# Patient Record
Sex: MALE | Race: WHITE | URBAN - METROPOLITAN AREA
[De-identification: names, ages, dates, MRNs, and addresses within clinical notes are randomized per-mention and may not be internally consistent; named-entity substitution may affect disease eponyms.]

---

## 2017-12-12 ENCOUNTER — OFFICE VISIT (OUTPATIENT)
Dept: FAMILY MEDICINE CLINIC | Facility: CLINIC | Age: 32
End: 2017-12-12

## 2017-12-12 VITALS
WEIGHT: 214 LBS | DIASTOLIC BLOOD PRESSURE: 80 MMHG | HEART RATE: 72 BPM | RESPIRATION RATE: 15 BRPM | HEIGHT: 70 IN | TEMPERATURE: 98 F | SYSTOLIC BLOOD PRESSURE: 110 MMHG | BODY MASS INDEX: 30.64 KG/M2

## 2017-12-12 DIAGNOSIS — R30.0 DYSURIA: ICD-10-CM

## 2017-12-12 DIAGNOSIS — R39.15 URINARY URGENCY: ICD-10-CM

## 2017-12-12 DIAGNOSIS — N20.0 RENAL CALCULI: Primary | ICD-10-CM

## 2017-12-12 PROBLEM — J45.909 ASTHMA: Status: ACTIVE | Noted: 2017-12-12

## 2017-12-12 PROBLEM — J30.89 ENVIRONMENTAL AND SEASONAL ALLERGIES: Status: ACTIVE | Noted: 2017-12-12

## 2017-12-12 PROCEDURE — 81003 URINALYSIS AUTO W/O SCOPE: CPT | Performed by: NURSE PRACTITIONER

## 2017-12-12 PROCEDURE — 99203 OFFICE O/P NEW LOW 30 MIN: CPT | Performed by: NURSE PRACTITIONER

## 2017-12-12 RX ORDER — CIPROFLOXACIN 500 MG/1
500 TABLET, FILM COATED ORAL 2 TIMES DAILY
Qty: 20 TABLET | Refills: 0 | Status: SHIPPED | OUTPATIENT
Start: 2017-12-12 | End: 2017-12-12

## 2017-12-12 RX ORDER — HYDROCODONE BITARTRATE AND ACETAMINOPHEN 5; 325 MG/1; MG/1
1 TABLET ORAL EVERY 6 HOURS PRN
COMMUNITY
End: 2017-12-27

## 2017-12-12 NOTE — PATIENT INSTRUCTIONS
Urine sample today to test for bladder infection. Will wait on urine culture to assess if antibiotic is needed. culture will take 3-4 days. Urology referral given.  Confirm with insurance that this urologist is in network  Go to ER if pain occurs, fever, c

## 2017-12-12 NOTE — PROGRESS NOTES
[address]  PROGRESS NOTE  Mk Patton is a 28year old male. Chief Complaint:  Patient presents with:   Other: requesting referral to urogist also having urianry frequency     HPI:   Patient presents to office visit with for ER nasal congestion, sore throat, vision changes.   RESPIRATORY: denies shortness of breath with exertion  CARDIOVASCULAR: denies chest pain on exertion  GI: denies abdominal pain and denies heartburn  : see HPI  NEURO: denies headaches    EXAM:   /80 [employer] care instructions as listed in Patient Instructions    Meds & Refills for this Visit:    No prescriptions requested or ordered in this encounter       Risks, benefits, side effects of medication addressed and explained.     Patient Instructions   Ur

## 2017-12-27 ENCOUNTER — APPOINTMENT (OUTPATIENT)
Dept: LAB | Age: 32
End: 2017-12-27
Attending: FAMILY MEDICINE
Payer: COMMERCIAL

## 2017-12-27 ENCOUNTER — OFFICE VISIT (OUTPATIENT)
Dept: FAMILY MEDICINE CLINIC | Facility: CLINIC | Age: 32
End: 2017-12-27

## 2017-12-27 VITALS
WEIGHT: 209.63 LBS | DIASTOLIC BLOOD PRESSURE: 80 MMHG | SYSTOLIC BLOOD PRESSURE: 118 MMHG | TEMPERATURE: 97 F | HEART RATE: 64 BPM | HEIGHT: 70.5 IN | BODY MASS INDEX: 29.68 KG/M2 | RESPIRATION RATE: 16 BRPM

## 2017-12-27 DIAGNOSIS — Z11.3 SCREEN FOR STD (SEXUALLY TRANSMITTED DISEASE): Primary | ICD-10-CM

## 2017-12-27 PROCEDURE — 86696 HERPES SIMPLEX TYPE 2 TEST: CPT | Performed by: FAMILY MEDICINE

## 2017-12-27 PROCEDURE — 86780 TREPONEMA PALLIDUM: CPT | Performed by: FAMILY MEDICINE

## 2017-12-27 PROCEDURE — 87340 HEPATITIS B SURFACE AG IA: CPT | Performed by: FAMILY MEDICINE

## 2017-12-27 PROCEDURE — 86694 HERPES SIMPLEX NES ANTBDY: CPT | Performed by: FAMILY MEDICINE

## 2017-12-27 PROCEDURE — 86803 HEPATITIS C AB TEST: CPT | Performed by: FAMILY MEDICINE

## 2017-12-27 PROCEDURE — 86706 HEP B SURFACE ANTIBODY: CPT | Performed by: FAMILY MEDICINE

## 2017-12-27 PROCEDURE — 36415 COLL VENOUS BLD VENIPUNCTURE: CPT | Performed by: FAMILY MEDICINE

## 2017-12-27 PROCEDURE — 99214 OFFICE O/P EST MOD 30 MIN: CPT | Performed by: FAMILY MEDICINE

## 2017-12-27 PROCEDURE — 86695 HERPES SIMPLEX TYPE 1 TEST: CPT | Performed by: FAMILY MEDICINE

## 2017-12-27 PROCEDURE — 87389 HIV-1 AG W/HIV-1&-2 AB AG IA: CPT | Performed by: FAMILY MEDICINE

## 2017-12-27 NOTE — PROGRESS NOTES
[facility] [place]  PROGRESS NOTE  Chief Complaint:   Patient presents with:  STD: pt wants std check      HPI:   This is a 28year old male presents for STD screening test.  Patient does not have any history of STD, also denies any recent expos edema, dyspnea on exertion or at rest.  RESPIRATORY:  Denies shortness of breath, wheezing, cough or sputum. GASTROINTESTINAL:  Denies abdominal pain, nausea, vomiting, constipation, diarrhea, or blood in stool.   MUSCULOSKELETAL:  Denies weakness, muscle Debora Guerreroon was seen today for std.     Diagnoses and all orders for this visit:    Screen for STD (sexually transmitted disease)  -     T PALLIDUM SCREENING CASCADE  -     HIV AG AB COMBO  -     HEPATITIS B SURFACE ANTIBODY  -     HEPATITIS B SURFACE ANTIGEN

## 2017-12-28 ENCOUNTER — TELEPHONE (OUTPATIENT)
Dept: FAMILY MEDICINE CLINIC | Facility: CLINIC | Age: 32
End: 2017-12-28

## 2017-12-28 RX ORDER — AZITHROMYCIN 500 MG/1
1000 TABLET, FILM COATED ORAL ONCE
Qty: 2 TABLET | Refills: 0 | Status: SHIPPED | OUTPATIENT
Start: 2017-12-28 | End: 2017-12-28

## 2017-12-28 NOTE — TELEPHONE ENCOUNTER
Please inform patient that he is positive for chlamydia. Recommend to treat with azithromycin single dose. Will send medication to pharmacy. Also recommend that he inform his partner and have them get it treated also.   Rest of the STD panel is negative,

## 2017-12-28 NOTE — TELEPHONE ENCOUNTER
Let pt know the following below. Pt verbalized his understanding and had no other questions at this time. Medication sent. Form filled out.

## 2017-12-29 ENCOUNTER — TELEPHONE (OUTPATIENT)
Dept: FAMILY MEDICINE CLINIC | Facility: CLINIC | Age: 32
End: 2017-12-29

## 2018-03-16 ENCOUNTER — OFFICE VISIT (OUTPATIENT)
Dept: FAMILY MEDICINE CLINIC | Facility: CLINIC | Age: 33
End: 2018-03-16

## 2018-03-16 VITALS
OXYGEN SATURATION: 99 % | HEIGHT: 70 IN | HEART RATE: 70 BPM | TEMPERATURE: 98 F | DIASTOLIC BLOOD PRESSURE: 74 MMHG | BODY MASS INDEX: 29.12 KG/M2 | RESPIRATION RATE: 20 BRPM | WEIGHT: 203.38 LBS | SYSTOLIC BLOOD PRESSURE: 122 MMHG

## 2018-03-16 DIAGNOSIS — J45.901 MILD ASTHMA EXACERBATION: Primary | ICD-10-CM

## 2018-03-16 DIAGNOSIS — K52.9 GASTROENTERITIS: ICD-10-CM

## 2018-03-16 PROCEDURE — 99213 OFFICE O/P EST LOW 20 MIN: CPT | Performed by: NURSE PRACTITIONER

## 2018-03-16 RX ORDER — AZITHROMYCIN 250 MG/1
TABLET, FILM COATED ORAL
Qty: 6 TABLET | Refills: 0 | Status: SHIPPED | OUTPATIENT
Start: 2018-03-16 | End: 2019-05-20 | Stop reason: ALTCHOICE

## 2018-03-16 RX ORDER — METHYLPREDNISOLONE 4 MG/1
TABLET ORAL
Qty: 1 KIT | Refills: 0 | Status: SHIPPED | OUTPATIENT
Start: 2018-03-16 | End: 2019-05-20 | Stop reason: ALTCHOICE

## 2018-03-16 NOTE — PROGRESS NOTES
Chief complaint:  Patient presents with:  Nausea  Fatigue  Body ache and/or chills    HPI:   Savana Johnson is a 28year old male who presents for c/o congestion, cough, intermittent wheezing, nausea, malaise, intermittent chills, mild generalized abd p EYES:PERRLA, EOMI, conjunctiva are clear, no injection or discharge, no orbital edema or erythema  HEAD: atraumatic, normocephalic, no sinus tenderness on palpation  ENT: TMs pearly, no bulging, no retraction, no fluid, landmarks present.  nares patent, muc Your healthcare provider may recommend a bland diet if you have an upset stomach. It consists of foods that are mild and easy to digest. It is better to eat small frequent meals rather than 3 large meals a day.     Beverages  OK: Fruit juices, non-caffeinat © 5860-6600 [address] [facility], [facility] [place]. All rights reserved. This information is not intended as a substitute for professional medical care. Always follow your healthcare professional's instructions.             The

## 2018-03-16 NOTE — PATIENT INSTRUCTIONS
Push fluids, rest.  Antibiotic as prescribed, take with food. Take medrol dose pack as prescribed, take with food. Use albuterol inhaler 2 puffs every 6 hours x 3 days, then as prescribed as needed. [place] diet and progress slowly as tolerated.    Far[place]h Avoid: Vegetables prepared with those spices to \"avoid\"; skin and seeds of vegetables and those with coarse fiber  Spices  OK: Salt, lemon and lime juice, vinegar, all extracts, ciara, cinnamon, thyme, mace, allspice, paprika  Avoid: Chili powder, cloves,

## 2018-03-21 ENCOUNTER — TELEPHONE (OUTPATIENT)
Dept: FAMILY MEDICINE CLINIC | Facility: CLINIC | Age: 33
End: 2018-03-21

## 2018-03-21 NOTE — TELEPHONE ENCOUNTER
Patient was seen on 3/16 for nausea, fatigue, body ache and chills. Patient was given a note for work to excuse from the day of visit but work is requesting a letter to release him back to work.      Patient states that he has been working since 3/17 but th

## 2018-11-28 NOTE — TELEPHONE ENCOUNTER
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 Recommend to return to clinic if he frequently has sores on his lips.   He is negative for HSV t Tramadol Approved        Filling Pharmacy: [facility]  Additional Information: Pharmacy contacted - received paid claim. Pharmacy will contact patient when medication is ready to be picked up.

## 2019-05-20 ENCOUNTER — OFFICE VISIT (OUTPATIENT)
Dept: FAMILY MEDICINE CLINIC | Facility: CLINIC | Age: 34
End: 2019-05-20
Payer: COMMERCIAL

## 2019-05-20 VITALS
BODY MASS INDEX: 34.07 KG/M2 | SYSTOLIC BLOOD PRESSURE: 120 MMHG | RESPIRATION RATE: 18 BRPM | OXYGEN SATURATION: 98 % | WEIGHT: 238 LBS | DIASTOLIC BLOOD PRESSURE: 78 MMHG | TEMPERATURE: 97 F | HEART RATE: 68 BPM | HEIGHT: 70 IN

## 2019-05-20 DIAGNOSIS — R51.9 COMPLAINT OF HEADACHE: ICD-10-CM

## 2019-05-20 DIAGNOSIS — Z00.00 ENCOUNTER FOR ANNUAL HEALTH EXAMINATION: Primary | ICD-10-CM

## 2019-05-20 DIAGNOSIS — R07.89 ATYPICAL CHEST PAIN: ICD-10-CM

## 2019-05-20 DIAGNOSIS — R19.8 GASTROINTESTINAL COMPLAINTS: Primary | ICD-10-CM

## 2019-05-20 PROCEDURE — 99214 OFFICE O/P EST MOD 30 MIN: CPT | Performed by: NURSE PRACTITIONER

## 2019-05-20 NOTE — PATIENT INSTRUCTIONS
Elimination diet, starting with dairy. Do 7-10 days of common food groups (dairy, wheat/gluten, etc.) and remove them completely from your diet to assess for symptom improvement. Start Benefiber once a day. Start a probiotic once a day.    Continue to

## 2019-05-20 NOTE — PROGRESS NOTES
[facility] [place]  PROGRESS NOTE  Chief Complaint:   Patient presents with:  Loose Stools  Headache: 4-5 pain  Chest Pain: right side No SOB      HPI:   This is a 35year old male coming in for multiple medical concerns.     Loose stools for the utilizing the affected site. Past Medical History:   Diagnosis Date   • Asthma    • Renal calculi      History reviewed. No pertinent surgical history.   Social History:  Social History    Tobacco Use      Smoking status: Never Smoker      Smokeless t Estimated body mass index is 34.15 kg/m² as calculated from the following:    Height as of this encounter: 70\". Weight as of this encounter: 238 lb.    Wt Readings from Last 6 Encounters:  05/20/19 : 238 lb  03/16/18 : 203 lb 6 oz  12/27/17 : 209 lb 9.6 noted.    Okay to continue to use PRN ibuprofen for headache. 3. Atypical chest pain  No precipitating factors to right chest \"discomfort. \"  Not precipitated by activity and associated by other symptoms.   Pinching sensation comes and goes without the treatments as a result of today.      Problem List:  Patient Active Problem List:     Environmental and seasonal allergies     Asthma     Renal calculi      Maritza Foot, APRN  5/20/2019  9:13 AM    [unfilled]

## 2020-03-23 ENCOUNTER — TELEPHONE (OUTPATIENT)
Dept: FAMILY MEDICINE CLINIC | Facility: CLINIC | Age: 35
End: 2020-03-23

## 2020-03-23 RX ORDER — ALBUTEROL SULFATE 90 UG/1
1 AEROSOL, METERED RESPIRATORY (INHALATION) EVERY 4 HOURS PRN
Qty: 1 INHALER | Refills: 0 | Status: SHIPPED | OUTPATIENT
Start: 2020-03-23 | End: 2020-09-28

## 2020-03-23 NOTE — TELEPHONE ENCOUNTER
Patient states that his employer won't let him return to work until he takes a letter stating that he has asthma, patient would like Dr to write him a letter and also pt needs refill in his inhaler to [address] in [place] (pt didn't know the name o

## 2020-03-23 NOTE — TELEPHONE ENCOUNTER
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 States with his Asthma, he is asking if Dr. Blossom Holland can write him a note stating either     A.  He should be excused from work until at least April 7t

## 2020-03-23 NOTE — TELEPHONE ENCOUNTER
Albuterol inhaler sent. covid 19 letter in chart. Recommend patient to go to [facility] website for further information regarding virus and how to take care of self. If he has any symptoms then recommend to call.

## 2020-03-24 ENCOUNTER — TELEPHONE (OUTPATIENT)
Dept: FAMILY MEDICINE CLINIC | Facility: CLINIC | Age: 35
End: 2020-03-24

## 2020-03-24 NOTE — TELEPHONE ENCOUNTER
Please inform patient that currently that is only letter that we are writing. Recommend to call if he is having any fever, shortness of breath or any chest tightness.

## 2020-03-24 NOTE — TELEPHONE ENCOUNTER
Patient is calling stating that he picked up the note that was given to him and patient states that this is in no way anyway close to what he is needing. Patient states that all that letter is doing is telling his employer what the covid19 is.      Erin

## 2020-03-24 NOTE — TELEPHONE ENCOUNTER
Printed snapshot which patient can show to employer. I will not be able to give him any other letter. Patient can print his summary from my chart also.

## 2020-03-24 NOTE — TELEPHONE ENCOUNTER
Let pt know the following below. Pt verbalized his understanding and had no other questions at this time.    Letter at

## 2020-03-24 NOTE — TELEPHONE ENCOUNTER
Patient is upset about the following below. Patient states that all he wants is a letter simply stating that patient has a diagnosis of asthma. Patient states that all he needs the letter to say. Dr Sofi Mcclelland can you please advise.

## 2020-09-21 ENCOUNTER — VIRTUAL PHONE E/M (OUTPATIENT)
Dept: FAMILY MEDICINE CLINIC | Facility: CLINIC | Age: 35
End: 2020-09-21
Payer: COMMERCIAL

## 2020-09-21 VITALS — TEMPERATURE: 97 F | WEIGHT: 215 LBS | BODY MASS INDEX: 31 KG/M2

## 2020-09-21 DIAGNOSIS — R52 GENERALIZED BODY ACHES: ICD-10-CM

## 2020-09-21 DIAGNOSIS — R51.9 HEADACHE IN BACK OF HEAD: ICD-10-CM

## 2020-09-21 DIAGNOSIS — J02.9 SORE THROAT: ICD-10-CM

## 2020-09-21 DIAGNOSIS — R05.9 COUGH: Primary | ICD-10-CM

## 2020-09-21 PROCEDURE — G2012 BRIEF CHECK IN BY MD/QHP: HCPCS | Performed by: NURSE PRACTITIONER

## 2020-09-21 NOTE — PROGRESS NOTES
Virtual Telephone Check-In    Luis A Rosa verbally consents to a Virtual/Telephone Check-In visit on 09/21/20. Patient has been referred to the [facility] website at [email or web address] to review the yearly Consent to Treat document.     Patient under

## 2020-09-25 ENCOUNTER — TELEPHONE (OUTPATIENT)
Dept: FAMILY MEDICINE CLINIC | Facility: CLINIC | Age: 35
End: 2020-09-25

## 2020-09-25 NOTE — TELEPHONE ENCOUNTER
Pts boss needs a letter stating that we sent him to get a Covid test and that we have not received the results yet or call pt with results.

## 2020-09-25 NOTE — TELEPHONE ENCOUNTER
COVID test done through [facility], results have not yet been faxed to me though I can see them in [facility]. COVID test is negative on 09/21/20 though I cannot print results from [address]. Need symptom update.     I can send a AMTt message thro

## 2020-09-25 NOTE — TELEPHONE ENCOUNTER
Patient informed of the below results. Patient states he is feeling a lot better. Is no longer having any body aches, headaches, SOB, or fever. Does still have some coughing and nasal drainage but is otherwise he is feeling good.      Patient missed work

## 2020-09-28 ENCOUNTER — TELEPHONE (OUTPATIENT)
Dept: FAMILY MEDICINE CLINIC | Facility: CLINIC | Age: 35
End: 2020-09-28

## 2020-09-28 ENCOUNTER — OFFICE VISIT (OUTPATIENT)
Dept: FAMILY MEDICINE CLINIC | Facility: CLINIC | Age: 35
End: 2020-09-28
Payer: COMMERCIAL

## 2020-09-28 VITALS
TEMPERATURE: 98 F | HEART RATE: 81 BPM | BODY MASS INDEX: 30.21 KG/M2 | SYSTOLIC BLOOD PRESSURE: 122 MMHG | OXYGEN SATURATION: 98 % | WEIGHT: 211 LBS | HEIGHT: 70 IN | DIASTOLIC BLOOD PRESSURE: 80 MMHG | RESPIRATION RATE: 20 BRPM

## 2020-09-28 DIAGNOSIS — R09.81 NASAL CONGESTION: ICD-10-CM

## 2020-09-28 DIAGNOSIS — R05.9 COUGH: Primary | ICD-10-CM

## 2020-09-28 PROCEDURE — 99213 OFFICE O/P EST LOW 20 MIN: CPT | Performed by: NURSE PRACTITIONER

## 2020-09-28 PROCEDURE — 3008F BODY MASS INDEX DOCD: CPT | Performed by: NURSE PRACTITIONER

## 2020-09-28 PROCEDURE — 3079F DIAST BP 80-89 MM HG: CPT | Performed by: NURSE PRACTITIONER

## 2020-09-28 PROCEDURE — 3074F SYST BP LT 130 MM HG: CPT | Performed by: NURSE PRACTITIONER

## 2020-09-28 RX ORDER — TAMSULOSIN HYDROCHLORIDE 0.4 MG/1
0.4 CAPSULE ORAL DAILY
COMMUNITY
Start: 2017-11-19

## 2020-09-28 RX ORDER — DOXYCYCLINE HYCLATE 100 MG
100 TABLET ORAL 2 TIMES DAILY
Qty: 20 TABLET | Refills: 0 | Status: SHIPPED | OUTPATIENT
Start: 2020-09-28 | End: 2020-10-08

## 2020-09-28 RX ORDER — ALBUTEROL SULFATE 90 UG/1
1 AEROSOL, METERED RESPIRATORY (INHALATION) EVERY 4 HOURS PRN
Qty: 1 INHALER | Refills: 0 | Status: SHIPPED | OUTPATIENT
Start: 2020-09-28 | End: 2021-12-30

## 2020-09-28 NOTE — PROGRESS NOTES
CHIEF COMPLAINT:   Patient presents with:  Cough  Sinus Problem      HPI:   Martinez Hilton is a 28year old male who presents to clinic today with complaints of sinus congestion and cough.     Did virtual visit 09/21/20, tested for covid which was nega /80   Pulse 81   Temp 98.1 °F (36.7 °C)   Resp 20   Ht 70\"   Wt 211 lb (95.7 kg)   SpO2 98%   BMI 30.28 kg/m²   Wt Readings from Last 6 Encounters:  09/28/20 : 211 lb (95.7 kg)  09/21/20 : 215 lb (97.5 kg)  05/20/19 : 238 lb (108 kg)  03/16/18 : 2 Stressed importance of completing full course of antibiotic. Patient Instructions   COVID test is negative, no work restrictions at this time. Doxycycline 100mg twice a day for ten days for sinus congestion; take with food.   Start oral claritin once

## 2020-09-28 NOTE — PATIENT INSTRUCTIONS
COVID test is negative, no work restrictions at this time. Doxycycline 100mg twice a day for ten days for sinus congestion; take with food. Start oral claritin once a day for allergy management.   Use your albuterol inhaler 2 puffs every 4 while awake for

## 2020-09-28 NOTE — TELEPHONE ENCOUNTER
Patient's Covid testing came back Negative. States he still has a productive cough and congested nose. Patient denies having a fever. States this is day 9 of being sick.   Patient did go to work today but was asked to leave until he talked with his Doct

## 2020-09-28 NOTE — TELEPHONE ENCOUNTER
Recommend in office evaluation for symptoms for lung assessment, ENT assessment, vital signs, etc.  Since coughing (though covid is negative) would recommend calling when arriving and having come in through side door.

## 2020-09-28 NOTE — TELEPHONE ENCOUNTER
Patient informed of the below recommendations. Appt scheduled.      Future Appointments   Date Time Provider Tita Thomson   9/28/2020  2:30 PM Danley Sever, APRN [facility]

## 2021-04-09 DIAGNOSIS — Z23 NEED FOR VACCINATION: ICD-10-CM

## 2021-12-28 DIAGNOSIS — R05.9 COUGH: ICD-10-CM

## 2021-12-28 RX ORDER — ALBUTEROL SULFATE 90 UG/1
AEROSOL, METERED RESPIRATORY (INHALATION)
Qty: 25.5 G | Refills: 0 | OUTPATIENT
Start: 2021-12-28

## 2021-12-30 ENCOUNTER — PATIENT MESSAGE (OUTPATIENT)
Dept: FAMILY MEDICINE CLINIC | Facility: CLINIC | Age: 36
End: 2021-12-30

## 2021-12-30 DIAGNOSIS — R05.9 COUGH: ICD-10-CM

## 2021-12-30 RX ORDER — ALBUTEROL SULFATE 90 UG/1
1 AEROSOL, METERED RESPIRATORY (INHALATION) EVERY 4 HOURS PRN
Qty: 1 EACH | Refills: 0 | Status: SHIPPED | OUTPATIENT
Start: 2021-12-30

## 2021-12-30 NOTE — TELEPHONE ENCOUNTER
Please advise.      Future appt:    Last Appointment with provider:  9/28/2020    [place] [facility]:  Visit date not found  No results found for: CHOLEST, HDL, LDL, TRIGLY, TRIG  No results found for: EAG, A1C  No results found for: T4F, TSH,

## 2021-12-30 NOTE — TELEPHONE ENCOUNTER
From: Olivia Warren  To:  MELA Vogel  Sent: 12/30/2021 8:55 AM CST  Subject: new inhaler    can i please get a new inhaler refill

## 2021-12-30 NOTE — TELEPHONE ENCOUNTER
Pt martina response:    i would really just like a new inhaler please nothing has changed and i dont like to have to go out if i dont have to    i have a new insurance is all that changed and i dont have the money for a vist then an inhaler

## 2022-04-04 ENCOUNTER — OFFICE VISIT (OUTPATIENT)
Dept: FAMILY MEDICINE CLINIC | Facility: CLINIC | Age: 37
End: 2022-04-04
Payer: MEDICAID

## 2022-04-04 VITALS
WEIGHT: 229 LBS | SYSTOLIC BLOOD PRESSURE: 122 MMHG | TEMPERATURE: 98 F | BODY MASS INDEX: 32.78 KG/M2 | RESPIRATION RATE: 16 BRPM | OXYGEN SATURATION: 97 % | HEIGHT: 70 IN | DIASTOLIC BLOOD PRESSURE: 78 MMHG | HEART RATE: 70 BPM

## 2022-04-04 DIAGNOSIS — J30.1 SEASONAL ALLERGIC RHINITIS DUE TO POLLEN: ICD-10-CM

## 2022-04-04 DIAGNOSIS — J45.20 MILD INTERMITTENT ASTHMA, UNSPECIFIED WHETHER COMPLICATED: Primary | ICD-10-CM

## 2022-04-04 PROCEDURE — 3074F SYST BP LT 130 MM HG: CPT | Performed by: NURSE PRACTITIONER

## 2022-04-04 PROCEDURE — 3008F BODY MASS INDEX DOCD: CPT | Performed by: NURSE PRACTITIONER

## 2022-04-04 PROCEDURE — 99213 OFFICE O/P EST LOW 20 MIN: CPT | Performed by: NURSE PRACTITIONER

## 2022-04-04 PROCEDURE — 3078F DIAST BP <80 MM HG: CPT | Performed by: NURSE PRACTITIONER

## 2022-04-04 RX ORDER — LORATADINE 10 MG/1
10 TABLET ORAL DAILY
COMMUNITY

## 2022-04-04 RX ORDER — FLUTICASONE PROPIONATE 50 MCG
1 SPRAY, SUSPENSION (ML) NASAL DAILY
COMMUNITY

## 2022-04-04 RX ORDER — ALBUTEROL SULFATE 90 UG/1
1 AEROSOL, METERED RESPIRATORY (INHALATION) EVERY 4 HOURS PRN
Qty: 1 EACH | Refills: 1 | Status: SHIPPED | OUTPATIENT
Start: 2022-04-04

## 2022-04-04 NOTE — PATIENT INSTRUCTIONS
Refill of albuterol   Start your loratadine once a day and start flonase 1 spray each nostril daily; use through the spring allergy season, use consistently  Recommend Pneumonia vaccination (PCV 23) either here, health department, or through pharmacy with history of asthma; can check cost effectiveness between sites    Schedule an annual physical and fasting labs for age appropriate health screenings and fasting labs

## 2022-10-03 ENCOUNTER — OFFICE VISIT (OUTPATIENT)
Dept: FAMILY MEDICINE CLINIC | Facility: CLINIC | Age: 37
End: 2022-10-03
Payer: MEDICAID

## 2022-10-03 VITALS
DIASTOLIC BLOOD PRESSURE: 88 MMHG | OXYGEN SATURATION: 99 % | WEIGHT: 237 LBS | HEIGHT: 70 IN | HEART RATE: 84 BPM | RESPIRATION RATE: 16 BRPM | BODY MASS INDEX: 33.93 KG/M2 | SYSTOLIC BLOOD PRESSURE: 116 MMHG | TEMPERATURE: 97 F

## 2022-10-03 DIAGNOSIS — L03.011 PARONYCHIA OF FINGER OF RIGHT HAND: Primary | ICD-10-CM

## 2022-10-03 PROCEDURE — 87070 CULTURE OTHR SPECIMN AEROBIC: CPT | Performed by: NURSE PRACTITIONER

## 2022-10-03 PROCEDURE — 87147 CULTURE TYPE IMMUNOLOGIC: CPT | Performed by: NURSE PRACTITIONER

## 2022-10-03 PROCEDURE — 99213 OFFICE O/P EST LOW 20 MIN: CPT | Performed by: NURSE PRACTITIONER

## 2022-10-03 PROCEDURE — 87205 SMEAR GRAM STAIN: CPT | Performed by: NURSE PRACTITIONER

## 2022-10-03 PROCEDURE — 87186 SC STD MICRODIL/AGAR DIL: CPT | Performed by: NURSE PRACTITIONER

## 2022-10-03 PROCEDURE — 3074F SYST BP LT 130 MM HG: CPT | Performed by: NURSE PRACTITIONER

## 2022-10-03 PROCEDURE — 3008F BODY MASS INDEX DOCD: CPT | Performed by: NURSE PRACTITIONER

## 2022-10-03 PROCEDURE — 3079F DIAST BP 80-89 MM HG: CPT | Performed by: NURSE PRACTITIONER

## 2022-10-03 RX ORDER — CEPHALEXIN 500 MG/1
500 CAPSULE ORAL 3 TIMES DAILY
Qty: 21 CAPSULE | Refills: 0 | Status: SHIPPED | OUTPATIENT
Start: 2022-10-03 | End: 2022-10-10

## 2022-10-03 NOTE — PATIENT INSTRUCTIONS
Take keflex until gone - follow up if symptoms persist or increase - red line up your arm, fever, etc     Soak in epsom salt and warm water -

## 2023-01-30 DIAGNOSIS — J45.20 MILD INTERMITTENT ASTHMA, UNSPECIFIED WHETHER COMPLICATED: ICD-10-CM

## 2023-01-31 RX ORDER — ALBUTEROL SULFATE 90 UG/1
AEROSOL, METERED RESPIRATORY (INHALATION)
Qty: 1 EACH | Refills: 0 | Status: SHIPPED | OUTPATIENT
Start: 2023-01-31

## 2023-01-31 NOTE — TELEPHONE ENCOUNTER
Refill sent, [employer] message sent additional refills after patient seen for physical, only acute visits noted in epic.

## 2023-01-31 NOTE — TELEPHONE ENCOUNTER
Last appt  4/4/22     Future appt:    Last Appointment with provider:   Visit date not found  [place] [facility]  10/3/2022  No results found for: CHOLEST, HDL, LDL, TRIGLY, TRIG  No results found for: EAG, A1C  No results found for: T4F, TSH, TSHT4    No follow-ups on file.

## 2023-09-18 ENCOUNTER — TELEPHONE (OUTPATIENT)
Dept: FAMILY MEDICINE CLINIC | Facility: CLINIC | Age: 38
End: 2023-09-18

## 2023-09-20 ENCOUNTER — OFFICE VISIT (OUTPATIENT)
Dept: FAMILY MEDICINE CLINIC | Facility: CLINIC | Age: 38
End: 2023-09-20
Payer: MEDICAID

## 2023-09-20 VITALS
WEIGHT: 239 LBS | OXYGEN SATURATION: 95 % | RESPIRATION RATE: 18 BRPM | SYSTOLIC BLOOD PRESSURE: 126 MMHG | HEART RATE: 84 BPM | HEIGHT: 70 IN | DIASTOLIC BLOOD PRESSURE: 78 MMHG | BODY MASS INDEX: 34.22 KG/M2 | TEMPERATURE: 98 F

## 2023-09-20 DIAGNOSIS — K80.50 BILIARY PAIN: Primary | ICD-10-CM

## 2023-09-20 DIAGNOSIS — J45.20 MILD INTERMITTENT ASTHMA, UNSPECIFIED WHETHER COMPLICATED: ICD-10-CM

## 2023-09-20 PROCEDURE — 3078F DIAST BP <80 MM HG: CPT

## 2023-09-20 PROCEDURE — 3008F BODY MASS INDEX DOCD: CPT

## 2023-09-20 PROCEDURE — 99214 OFFICE O/P EST MOD 30 MIN: CPT

## 2023-09-20 PROCEDURE — 3074F SYST BP LT 130 MM HG: CPT

## 2023-09-20 RX ORDER — ONDANSETRON 4 MG/1
1 TABLET, ORALLY DISINTEGRATING ORAL EVERY 8 HOURS PRN
COMMUNITY
Start: 2023-09-18

## 2023-09-20 RX ORDER — ALBUTEROL SULFATE 90 UG/1
1 AEROSOL, METERED RESPIRATORY (INHALATION) EVERY 4 HOURS PRN
Qty: 1 EACH | Refills: 0 | Status: SHIPPED | OUTPATIENT
Start: 2023-09-20

## 2023-09-20 NOTE — PATIENT INSTRUCTIONS
Diclofenac for pain  Please see Dr. Maritza Butts, increase your diet as able with a low fat diet. Refill your asthma medication. Home care  You may have acute cholecystitis if you have ongoing symptoms that don't get better, or if you get a fever with your symptoms. You should go to the emergency room. This is not a condition that should be treated at home. Often surgery to remove the gallbladder (cholecystectomy) is needed. If you have short periods of gallbladder pain that go away, this is called biliary colic. Rest in bed and follow a clear liquid diet until the pain, upset stomach, and vomiting go away. Call your healthcare provider for an appointment. Biliary colic can keep coming back and can cause acute cholecystitis. Antibiotics and other medicine may be prescribed. Take these exactly as directed. You can take acetaminophen or ibuprofen for pain, unless you were given a different pain medicine to use. Talk with your provider before using these medicines if you:  Have chronic liver or kidney disease  Ever had a stomach ulcer or GI (gastrointestinal) bleeding  Are taking blood-thinner medicines  Fat in your diet makes the gallbladder contract and may cause more pain. Don't have any fat in your diet over the next 2 days. Follow a low-fat diet after that. If you are overweight, a low-fat diet will help you lose weight.

## (undated) NOTE — LETTER
Dear Employer,  At [facility], we are taking special precautions and doing everything we can to prevent the spread of COVID-19 (coronavirus disease 2019).  During this time, we ask for your assistance regarding physician documentation for empl

## (undated) NOTE — LETTER
Date: 9/25/2020    Patient Name: Kody Gao          To Whom it may concern: This letter has been written at the patient's request. The above patient was seen at the [facility] for treatment of a medical condition.     This patient s

## (undated) NOTE — LETTER
ASTHMA ACTION PLAN for Sendy Voss     : 8/10/1985     Date: 2022  Provider:  MELA Beltran  Phone for doctor or clinic:  [address], [facility]  [phone]           You can use the colors of a traffic light to help learn about your asthma medicines. 1. Green - Go! % of [employer] Flow Use controller medicine. Breathing is good  No cough or wheeze  Can work and play Medicine How much to take When to take it    Albuterol 2 puffs every 4-6 hours if needed wheezing, shortness of breath      2. Yellow - Caution. 50-79% [employer] [place]  Flow. Use reliever medicine to keep an asthma attack from getting bad. Cough  Wheezing  Tight Chest  Wake up at night Medicine How much to take When to take it    Same as above  Notify the office       Additional instructions         3. Red - Stop! Danger!  <50% [employer] [place]  Flow. Take these medications until  Get help from a doctor   Medicine not helping  Breathing is hard and fast  Nose opens wide  Can't walk  Ribs show  Can't talk well Medicine How much to take When to take it    Same as above  ER precautions     Additional Instructions If your symptoms do not improve and you cannot contact your doctor, [facility] or call 911 immediately! [x] Asthma Action Plan reviewed with patient (and caregiver if necessary) and a copy of the plan was given to the patient/caregiver. [] Asthma Action Plan reviewed with patient (and caregiver if necessary) on the phone and mailed copy to patient or submitted via [address]      Signatures:  Provider  MELA Beltran   Patient Caretaker

## (undated) NOTE — LETTER
Date: 3/16/2018    Patient Name: Enedina Crook          To Whom it may concern: This letter has been written at the patient's request. The above patient was seen at the [facility] for treatment of a medical condition.     This patient s

## (undated) NOTE — LETTER
18    Re:  Ellen GARCES 8/10/1985    Dear Irene Farrell was seen in our office on 2018 with an acute illness.   He was excused from work on  due to the illness but he was able to return to work with no restrictions o